# Patient Record
Sex: FEMALE | Race: WHITE | NOT HISPANIC OR LATINO | Employment: OTHER | URBAN - METROPOLITAN AREA
[De-identification: names, ages, dates, MRNs, and addresses within clinical notes are randomized per-mention and may not be internally consistent; named-entity substitution may affect disease eponyms.]

---

## 2020-11-17 PROBLEM — I10 ESSENTIAL HYPERTENSION: Status: ACTIVE | Noted: 2020-11-17

## 2020-11-17 PROBLEM — E11.59 TYPE 2 DIABETES MELLITUS WITH CIRCULATORY DISORDER, WITHOUT LONG-TERM CURRENT USE OF INSULIN: Status: ACTIVE | Noted: 2020-11-17

## 2020-11-17 PROBLEM — Z98.890 S/P ROTATOR CUFF REPAIR: Status: ACTIVE | Noted: 2019-06-20

## 2020-11-17 PROBLEM — S46.011A TRAUMATIC COMPLETE TEAR OF RIGHT ROTATOR CUFF: Status: ACTIVE | Noted: 2019-10-31

## 2021-04-28 DIAGNOSIS — Z12.11 COLON CANCER SCREENING: ICD-10-CM

## 2021-07-20 PROBLEM — R53.81 PHYSICAL DEBILITY: Status: ACTIVE | Noted: 2021-07-20

## 2021-07-20 PROBLEM — I42.0 CARDIOMYOPATHY, DILATED, NONISCHEMIC: Status: ACTIVE | Noted: 2021-07-20
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
## 2021-07-26 PROBLEM — I63.81 LACUNAR STROKE: Chronic | Status: ACTIVE | Noted: 2017-01-26

## 2021-08-05 PROBLEM — E83.52 HYPERCALCEMIA: Status: ACTIVE | Noted: 2021-08-05

## 2021-08-05 PROBLEM — F41.8 ANXIETY WITH DEPRESSION: Status: ACTIVE | Noted: 2021-08-05

## 2021-08-05 PROBLEM — E87.1 HYPONATREMIA: Status: ACTIVE | Noted: 2021-08-05

## 2021-10-22 ENCOUNTER — PATIENT OUTREACH (OUTPATIENT)
Dept: ADMINISTRATIVE | Facility: HOSPITAL | Age: 76
End: 2021-10-22

## 2021-10-22 DIAGNOSIS — E11.59 TYPE 2 DIABETES MELLITUS WITH OTHER CIRCULATORY COMPLICATION, WITHOUT LONG-TERM CURRENT USE OF INSULIN: Primary | ICD-10-CM

## 2021-11-23 PROBLEM — K59.09 CHRONIC CONSTIPATION: Status: ACTIVE | Noted: 2021-11-23

## 2021-11-23 PROBLEM — E87.6 HYPOKALEMIA: Status: ACTIVE | Noted: 2021-11-23
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
## 2022-03-28 RX ORDER — FUROSEMIDE 40 MG/1
40 TABLET ORAL DAILY
Qty: 90 TABLET | Refills: 3 | Status: SHIPPED | OUTPATIENT
Start: 2022-03-28 | End: 2022-06-10

## 2022-03-28 NOTE — TELEPHONE ENCOUNTER
----- Message from Shira Dennis sent at 3/28/2022  8:45 AM CDT -----  Regarding: RX Refill Request  Type:  RX Refill Request    Who Called: Patient  Refill or New Rx:refill  RX Name and Strength:furosemide (LASIX) 40 MG tablet   2/10/2022   Sig: Take 40 mg by mouth once daily.  Class: Historical Med  Route: Oral    Preferred Pharmacy with phone number:[facility]/pharmacy #[id number] - [place] - [address] [facility] AT [address]  [address] [facility] [address]  Phone: [phone] Fax: [phone]    Local or Mail Order:local   Ordering Provider:Vj Moore MD  Would the patient rather a call back or a response via [facility]? Call back   Best Call Back Number:[phone]  Additional Information: Patient was seen last week and still has not gotten her medication from mail order and the RX listed above she was told she would need to come in for refill and she was just in last week. Please call patient back once this order has been sent to pharmacy, patient says she is gaining a pound a day 6 pounds total in a week she needs this medication

## 2022-07-01 PROBLEM — U07.1 COVID: Status: ACTIVE | Noted: 2022-07-01

## 2022-07-25 ENCOUNTER — EXTERNAL HOME HEALTH (OUTPATIENT)
Dept: HOME HEALTH SERVICES | Facility: HOSPITAL | Age: 77
End: 2022-07-25

## 2022-09-01 ENCOUNTER — PATIENT OUTREACH (OUTPATIENT)
Dept: HOME HEALTH SERVICES | Facility: HOSPITAL | Age: 77
End: 2022-09-01
Payer: MEDICARE

## 2022-09-02 ENCOUNTER — EXTERNAL HOME HEALTH (OUTPATIENT)
Dept: HOME HEALTH SERVICES | Facility: HOSPITAL | Age: 77
End: 2022-09-02
Payer: MEDICARE